# Patient Record
Sex: FEMALE | Race: OTHER | Employment: OTHER
[De-identification: names, ages, dates, MRNs, and addresses within clinical notes are randomized per-mention and may not be internally consistent; named-entity substitution may affect disease eponyms.]

---

## 2024-05-25 ENCOUNTER — APPOINTMENT (OUTPATIENT)
Facility: HOSPITAL | Age: 84
DRG: 689 | End: 2024-05-25

## 2024-05-25 ENCOUNTER — HOSPITAL ENCOUNTER (INPATIENT)
Facility: HOSPITAL | Age: 84
LOS: 2 days | Discharge: HOME | DRG: 689 | End: 2024-05-27
Attending: STUDENT IN AN ORGANIZED HEALTH CARE EDUCATION/TRAINING PROGRAM | Admitting: HOSPITALIST

## 2024-05-25 DIAGNOSIS — A41.9 SEPTICEMIA (HCC): ICD-10-CM

## 2024-05-25 DIAGNOSIS — I42.9 CARDIOMYOPATHY, UNSPECIFIED TYPE (HCC): ICD-10-CM

## 2024-05-25 DIAGNOSIS — L89.154 PRESSURE INJURY OF SACRAL REGION, STAGE 4 (HCC): Primary | ICD-10-CM

## 2024-05-25 DIAGNOSIS — M86.9 OSTEOMYELITIS, UNSPECIFIED SITE, UNSPECIFIED TYPE (HCC): ICD-10-CM

## 2024-05-25 DIAGNOSIS — J81.1 CHRONIC PULMONARY EDEMA: ICD-10-CM

## 2024-05-25 DIAGNOSIS — I48.91 ATRIAL FIBRILLATION, UNSPECIFIED TYPE (HCC): ICD-10-CM

## 2024-05-25 DIAGNOSIS — N39.0 URINARY TRACT INFECTION WITHOUT HEMATURIA, SITE UNSPECIFIED: ICD-10-CM

## 2024-05-25 LAB
ALBUMIN SERPL-MCNC: 2.6 G/DL (ref 3.5–5)
ALBUMIN/GLOB SERPL: 0.6 (ref 1.1–2.2)
ALP SERPL-CCNC: 82 U/L (ref 45–117)
ALT SERPL-CCNC: 13 U/L (ref 12–78)
ANION GAP SERPL CALC-SCNC: 9 MMOL/L (ref 5–15)
APPEARANCE UR: ABNORMAL
AST SERPL-CCNC: 6 U/L (ref 15–37)
BACTERIA URNS QL MICRO: ABNORMAL /HPF
BASOPHILS # BLD: 0 K/UL (ref 0–0.1)
BASOPHILS NFR BLD: 0 % (ref 0–1)
BILIRUB SERPL-MCNC: 0.6 MG/DL (ref 0.2–1)
BILIRUB UR QL: NEGATIVE
BUN SERPL-MCNC: 55 MG/DL (ref 6–20)
BUN/CREAT SERPL: 31 (ref 12–20)
CALCIUM SERPL-MCNC: 9.9 MG/DL (ref 8.5–10.1)
CHLORIDE SERPL-SCNC: 104 MMOL/L (ref 97–108)
CO2 SERPL-SCNC: 24 MMOL/L (ref 21–32)
COLOR UR: ABNORMAL
COMMENT:: NORMAL
CREAT SERPL-MCNC: 1.75 MG/DL (ref 0.55–1.02)
DIFFERENTIAL METHOD BLD: ABNORMAL
EOSINOPHIL # BLD: 0 K/UL (ref 0–0.4)
EOSINOPHIL NFR BLD: 0 % (ref 0–7)
EPITH CASTS URNS QL MICRO: ABNORMAL /LPF
ERYTHROCYTE [DISTWIDTH] IN BLOOD BY AUTOMATED COUNT: 19.1 % (ref 11.5–14.5)
GLOBULIN SER CALC-MCNC: 4.3 G/DL (ref 2–4)
GLUCOSE SERPL-MCNC: 121 MG/DL (ref 65–100)
GLUCOSE UR STRIP.AUTO-MCNC: NEGATIVE MG/DL
GRAN CASTS URNS QL MICRO: ABNORMAL /LPF
HCT VFR BLD AUTO: 34.1 % (ref 35–47)
HGB BLD-MCNC: 11 G/DL (ref 11.5–16)
HGB UR QL STRIP: ABNORMAL
IMM GRANULOCYTES # BLD AUTO: 0 K/UL (ref 0–0.04)
IMM GRANULOCYTES NFR BLD AUTO: 0 % (ref 0–0.5)
KETONES UR QL STRIP.AUTO: NEGATIVE MG/DL
LACTATE SERPL-SCNC: 0.5 MMOL/L (ref 0.4–2)
LEUKOCYTE ESTERASE UR QL STRIP.AUTO: ABNORMAL
LIPASE SERPL-CCNC: 9 U/L (ref 13–75)
LYMPHOCYTES # BLD: 0.7 K/UL (ref 0.8–3.5)
LYMPHOCYTES NFR BLD: 11 % (ref 12–49)
MCH RBC QN AUTO: 30 PG (ref 26–34)
MCHC RBC AUTO-ENTMCNC: 32.3 G/DL (ref 30–36.5)
MCV RBC AUTO: 92.9 FL (ref 80–99)
MONOCYTES # BLD: 0.6 K/UL (ref 0–1)
MONOCYTES NFR BLD: 9 % (ref 5–13)
NEUTS SEG # BLD: 5 K/UL (ref 1.8–8)
NEUTS SEG NFR BLD: 80 % (ref 32–75)
NITRITE UR QL STRIP.AUTO: NEGATIVE
NRBC # BLD: 0 K/UL (ref 0–0.01)
NRBC BLD-RTO: 0 PER 100 WBC
PH UR STRIP: 5.5 (ref 5–8)
PLATELET # BLD AUTO: 307 K/UL (ref 150–400)
PMV BLD AUTO: 10.9 FL (ref 8.9–12.9)
POTASSIUM SERPL-SCNC: 3.2 MMOL/L (ref 3.5–5.1)
PROT SERPL-MCNC: 6.9 G/DL (ref 6.4–8.2)
PROT UR STRIP-MCNC: 300 MG/DL
RBC # BLD AUTO: 3.67 M/UL (ref 3.8–5.2)
RBC #/AREA URNS HPF: ABNORMAL /HPF (ref 0–5)
RBC MORPH BLD: ABNORMAL
SODIUM SERPL-SCNC: 137 MMOL/L (ref 136–145)
SP GR UR REFRACTOMETRY: 1.02 (ref 1–1.03)
SPECIMEN HOLD: NORMAL
SPECIMEN HOLD: NORMAL
TROPONIN I SERPL HS-MCNC: 72 NG/L (ref 0–51)
UROBILINOGEN UR QL STRIP.AUTO: 1 EU/DL (ref 0.2–1)
WBC # BLD AUTO: 6.3 K/UL (ref 3.6–11)
WBC URNS QL MICRO: >100 /HPF (ref 0–4)
YEAST URNS QL MICRO: PRESENT

## 2024-05-25 PROCEDURE — 84484 ASSAY OF TROPONIN QUANT: CPT

## 2024-05-25 PROCEDURE — 2580000003 HC RX 258: Performed by: HOSPITALIST

## 2024-05-25 PROCEDURE — 87086 URINE CULTURE/COLONY COUNT: CPT

## 2024-05-25 PROCEDURE — 80053 COMPREHEN METABOLIC PANEL: CPT

## 2024-05-25 PROCEDURE — 83605 ASSAY OF LACTIC ACID: CPT

## 2024-05-25 PROCEDURE — 87040 BLOOD CULTURE FOR BACTERIA: CPT

## 2024-05-25 PROCEDURE — 81001 URINALYSIS AUTO W/SCOPE: CPT

## 2024-05-25 PROCEDURE — 99285 EMERGENCY DEPT VISIT HI MDM: CPT

## 2024-05-25 PROCEDURE — 6360000002 HC RX W HCPCS: Performed by: STUDENT IN AN ORGANIZED HEALTH CARE EDUCATION/TRAINING PROGRAM

## 2024-05-25 PROCEDURE — 71045 X-RAY EXAM CHEST 1 VIEW: CPT

## 2024-05-25 PROCEDURE — 83690 ASSAY OF LIPASE: CPT

## 2024-05-25 PROCEDURE — 36415 COLL VENOUS BLD VENIPUNCTURE: CPT

## 2024-05-25 PROCEDURE — 6370000000 HC RX 637 (ALT 250 FOR IP): Performed by: HOSPITALIST

## 2024-05-25 PROCEDURE — 74176 CT ABD & PELVIS W/O CONTRAST: CPT

## 2024-05-25 PROCEDURE — 87077 CULTURE AEROBIC IDENTIFY: CPT

## 2024-05-25 PROCEDURE — 6360000002 HC RX W HCPCS: Performed by: HOSPITALIST

## 2024-05-25 PROCEDURE — 2580000003 HC RX 258: Performed by: STUDENT IN AN ORGANIZED HEALTH CARE EDUCATION/TRAINING PROGRAM

## 2024-05-25 PROCEDURE — 93005 ELECTROCARDIOGRAM TRACING: CPT | Performed by: STUDENT IN AN ORGANIZED HEALTH CARE EDUCATION/TRAINING PROGRAM

## 2024-05-25 PROCEDURE — 02HV33Z INSERTION OF INFUSION DEVICE INTO SUPERIOR VENA CAVA, PERCUTANEOUS APPROACH: ICD-10-PCS | Performed by: HOSPITALIST

## 2024-05-25 PROCEDURE — 96365 THER/PROPH/DIAG IV INF INIT: CPT

## 2024-05-25 PROCEDURE — 87186 SC STD MICRODIL/AGAR DIL: CPT

## 2024-05-25 PROCEDURE — 85025 COMPLETE CBC W/AUTO DIFF WBC: CPT

## 2024-05-25 PROCEDURE — 2000000000 HC ICU R&B

## 2024-05-25 PROCEDURE — 94761 N-INVAS EAR/PLS OXIMETRY MLT: CPT

## 2024-05-25 RX ORDER — POLYETHYLENE GLYCOL 3350 17 G/17G
17 POWDER, FOR SOLUTION ORAL DAILY PRN
Status: DISCONTINUED | OUTPATIENT
Start: 2024-05-25 | End: 2024-05-27 | Stop reason: HOSPADM

## 2024-05-25 RX ORDER — SODIUM CHLORIDE 0.9 % (FLUSH) 0.9 %
5-40 SYRINGE (ML) INJECTION PRN
Status: DISCONTINUED | OUTPATIENT
Start: 2024-05-25 | End: 2024-05-27 | Stop reason: HOSPADM

## 2024-05-25 RX ORDER — HEPARIN SODIUM 5000 [USP'U]/ML
5000 INJECTION, SOLUTION INTRAVENOUS; SUBCUTANEOUS EVERY 8 HOURS SCHEDULED
Status: DISCONTINUED | OUTPATIENT
Start: 2024-05-25 | End: 2024-05-26

## 2024-05-25 RX ORDER — ONDANSETRON 4 MG/1
4 TABLET, ORALLY DISINTEGRATING ORAL EVERY 8 HOURS PRN
Status: DISCONTINUED | OUTPATIENT
Start: 2024-05-25 | End: 2024-05-27 | Stop reason: HOSPADM

## 2024-05-25 RX ORDER — SODIUM CHLORIDE 0.9 % (FLUSH) 0.9 %
5-40 SYRINGE (ML) INJECTION EVERY 12 HOURS SCHEDULED
Status: DISCONTINUED | OUTPATIENT
Start: 2024-05-25 | End: 2024-05-27 | Stop reason: HOSPADM

## 2024-05-25 RX ORDER — SODIUM CHLORIDE 9 MG/ML
INJECTION, SOLUTION INTRAVENOUS PRN
Status: DISCONTINUED | OUTPATIENT
Start: 2024-05-25 | End: 2024-05-27 | Stop reason: HOSPADM

## 2024-05-25 RX ORDER — ACETAMINOPHEN 325 MG/1
650 TABLET ORAL EVERY 6 HOURS PRN
Status: DISCONTINUED | OUTPATIENT
Start: 2024-05-25 | End: 2024-05-27 | Stop reason: HOSPADM

## 2024-05-25 RX ORDER — ONDANSETRON 2 MG/ML
4 INJECTION INTRAMUSCULAR; INTRAVENOUS EVERY 6 HOURS PRN
Status: DISCONTINUED | OUTPATIENT
Start: 2024-05-25 | End: 2024-05-27 | Stop reason: HOSPADM

## 2024-05-25 RX ORDER — DILTIAZEM HYDROCHLORIDE 5 MG/ML
10 INJECTION INTRAVENOUS
Status: DISCONTINUED | OUTPATIENT
Start: 2024-05-25 | End: 2024-05-27 | Stop reason: HOSPADM

## 2024-05-25 RX ORDER — LEVOTHYROXINE SODIUM 0.05 MG/1
50 TABLET ORAL
Status: DISCONTINUED | OUTPATIENT
Start: 2024-05-26 | End: 2024-05-27

## 2024-05-25 RX ORDER — FAMOTIDINE 20 MG/1
20 TABLET, FILM COATED ORAL DAILY
Status: DISCONTINUED | OUTPATIENT
Start: 2024-05-26 | End: 2024-05-27 | Stop reason: HOSPADM

## 2024-05-25 RX ORDER — ACETAMINOPHEN 650 MG/1
650 SUPPOSITORY RECTAL EVERY 6 HOURS PRN
Status: DISCONTINUED | OUTPATIENT
Start: 2024-05-25 | End: 2024-05-26

## 2024-05-25 RX ADMIN — HEPARIN SODIUM 5000 UNITS: 5000 INJECTION INTRAVENOUS; SUBCUTANEOUS at 21:45

## 2024-05-25 RX ADMIN — SODIUM CHLORIDE 5 MG/HR: 9 INJECTION, SOLUTION INTRAVENOUS at 19:00

## 2024-05-25 RX ADMIN — CEFEPIME 2000 MG: 2 INJECTION, POWDER, FOR SOLUTION INTRAVENOUS at 13:00

## 2024-05-25 RX ADMIN — SODIUM CHLORIDE: 9 INJECTION, SOLUTION INTRAVENOUS at 18:46

## 2024-05-25 RX ADMIN — NITROGLYCERIN 0.5 INCH: 20 OINTMENT TOPICAL at 16:11

## 2024-05-25 RX ADMIN — SODIUM CHLORIDE 1500 MG: 9 INJECTION, SOLUTION INTRAVENOUS at 18:47

## 2024-05-25 RX ADMIN — SODIUM CHLORIDE, PRESERVATIVE FREE 10 ML: 5 INJECTION INTRAVENOUS at 21:46

## 2024-05-25 ASSESSMENT — PAIN SCALES - GENERAL
PAINLEVEL_OUTOF10: 4
PAINLEVEL_OUTOF10: 0

## 2024-05-25 ASSESSMENT — PAIN DESCRIPTION - DESCRIPTORS: DESCRIPTORS: ACHING

## 2024-05-25 ASSESSMENT — PAIN DESCRIPTION - ORIENTATION: ORIENTATION: MID

## 2024-05-25 ASSESSMENT — PAIN DESCRIPTION - LOCATION: LOCATION: CHEST

## 2024-05-25 NOTE — ED NOTES
Patient altered at baseline. Using the , patient told this RN she lives with her niece and \"she should be here.\" No family currently present. Patient cannot remember her niece's name or phone number. Patient is a poor historian. Dr. White at bedside with patient.

## 2024-05-25 NOTE — CONSULTS
[facility] INITIAL ASSESSMENT.      Name: Cuco Chao   : 1940   MRN: [id number]   Date: 2024        Chief Complaint   Patient presents with    Emesis         HPI:     82 yo female with hx of htn, ESRD on HD presneting with chest pain, nausea and diarrhea.     Poor historian. Not able to give details despite .  Does not appear to have significant shortness of breath.  Reports that her chest pain has improved slightly.  Says that she has been having significant diarrhea for the last few days.  She has had 2 episodes of diarrhea since coming to the emergency room.  Reports feeling thirsty.  No fever or chills.  No vomiting since being in the emergency room.    No IV access despite multiple attempts in ED. SBP in 200s.        Problem list:     Hypertensive emergency  ESRD on hemodialysis  Dehydration  A.fib  Gastroenteritis  Difficult IV access  UTI    Assessment/Plan:     -Recommend Nitropatch or clonidine patch for hypertension in the absence of IV access.  - recommend prn hydralazine/labetalol for htn emergency.  -Cardene drip pain if SBP elevated despite hydralazine/labetalol -Nephrology consultation for dialysis.  -Cycle troponins.  -PPI for gastritis  -Antibiotics for UTI  -CT abdomen/pelvis without contrast reviewed.   -Primary team working on obtaining more information about the patient and being able to track patient's family.  Currently, we have limited information.  -Renal diet  -Heparin subcu for prophylaxis.  -Goals of care and CODE STATUS deferred to primary team.      I personally spent 30 minutes of critical care time.  This is time spent at this critically ill patient's bedside actively involved in patient care as well as the coordination of care and discussions with the patient's family.  This does not include any procedural time which has been billed separately.        Review of systems:     Review of Systems     Unable to provide detailed review of

## 2024-05-25 NOTE — ED NOTES
Patient IV has infiltrated. Multiple attempts have been with Ultrasound without success. MD, Dr. White notified.

## 2024-05-25 NOTE — ED PROVIDER NOTES
[facility] INTENSIVE CARE UNIT  EMERGENCY DEPARTMENT ENCOUNTER      Pt Name: Cuco Chao  MRN: [id number]  Birthdate 1940  Date of evaluation: 5/25/2024  Provider: Bernice White DO    CHIEF COMPLAINT       Chief Complaint   Patient presents with    Emesis       PMH No past medical history on file.      MDM:   Vitals:    Vitals:    05/25/24 1804   BP:    Pulse: (!) 110   Resp: 23   Temp:    SpO2: 93%           This is a 83 y.o. female with pmhx ESRD on HD T/R/Sa, dementia baseline Aox1-2 who presents today for cc of vomiting. Patient unable to provide history due to dementia,  utilized and she does complains of diarrhea today and denies chest pain. Per EMS family notes that patient had diarrhea and vomiting at home, increasingly fatigued.     On arrival VS include tachycardia, otherwise stable.   Physical Exam  General: Alert, no acute distress  HEENT: Normocephalic, atraumatic. EOMI, moist oral mucosa, no conjunctival injection  Neck: ROM normal, supple  Cardio: Heart regular rate and rhythm, cap refill <2seconds  Lungs: No respiratory distress, no wheezing, CTAB  Abdomen: Soft, nontender  MSK: chronic debility at baseline, large sacral decubitus ulcer stage IV with large amount of grayish discharge.  Skin: Warm, dry, no rash  Neuro: At baseline mentation, Aox2    Labs notable for UTI and urinalysis, mildly elevated troponin.  EKG showing A-fib.  Not entirely sure if she has A-fib at baseline is she is not aware of what her Social Security is and we cannot access her prior charts.  Imaging with some fluid overload, no acute process.  Will defer A-fib management to inpatient team.  Sepsis noted at time in the chart, I did give empiric coverage for likely osteomyelitis of the sacral decubitus ulcer as well as UTI.  Will admit for dialysis and further evaluation.    Diagnosis is sacral decubitus ulcer, osteomyelitis, UTI, septicemia, atrial fibrillation unknown if chronic or new,

## 2024-05-25 NOTE — ED TRIAGE NOTES
Patient arrives to the ED for complaints of nausea and vomiting with increased weakness for the past 24 hours. Patient is at baseline mentation.     Patient is due for dialysis.     Polish speaking.     PMH Dementia, Kidney failure.

## 2024-05-25 NOTE — ED NOTES
TRANSFER - OUT REPORT:    Verbal report given to Mary RN on Cuco Chao  being transferred to ICU for routine progression of patient care       Report consisted of patient's Situation, Background, Assessment and   Recommendations(SBAR).     Information from the following report(s) Index, MAR, Recent Results, and Neuro Assessment was reviewed with the receiving nurse.    [place] Fall Assessment:    Presents to emergency department  because of falls (Syncope, seizure, or loss of consciousness): No  Age > 70: Yes  Altered Mental Status, Intoxication with alcohol or substance confusion (Disorientation, impaired judgment, poor safety awaremess, or inability to follow instructions): Yes  Impaired Mobility: Ambulates or transfers with assistive devices or assistance; Unable to ambulate or transer.: Yes  Nursing Judgement: Yes          Lines:       Opportunity for questions and clarification was provided.      Patient transported with:  Registered Nurse, Monitor

## 2024-05-25 NOTE — PROCEDURES
PROCEDURE NOTE  Date: 5/25/2024   Name: Cuco Chao  YOB: 1940    Central Line    Date/Time: 5/25/2024 5:00 PM    Performed by: Pastor Russ MD  Authorized by: Pastor Russ MD    Consent:     Consent obtained:  Verbal    Consent given by:  Patient    Risks, benefits, and alternatives were discussed: yes    Universal protocol:     Patient identity confirmed:  Verbally with patient and arm band  Pre-procedure details:     Indication(s): insufficient peripheral access      Hand hygiene: Hand hygiene performed prior to insertion      Sterile barrier technique: All elements of maximal sterile technique followed      Skin preparation:  Chlorhexidine    Skin preparation agent: Skin preparation agent completely dried prior to procedure    Sedation:     Sedation type:  None  Procedure details:     Location:  L internal jugular    Patient position:  Trendelenburg    Procedural supplies:  Triple lumen    Number of attempts:  1    Successful placement: yes    Post-procedure details:     Post-procedure:  Line sutured    Assessment:  Blood return through all ports    Procedure completion:  Tolerated well, no immediate complications

## 2024-05-25 NOTE — H&P
Hospitalist Admission Note      NAME:  Cuco Chao   :  1940   MRN:  [id number]     Date/Time:  2024 2:13 PM    Patient PCP: None, None    ________________________________________________________________________    Given the patient's current clinical presentation, I have a high level of concern for decompensation if discharged from the emergency department.  Complex decision making was performed, which includes reviewing the patient's available past medical records, laboratory results, and x-ray films.       My assessment of this patient's clinical condition and my plan of care is as follows.    Assessment / Plan:  Patient is a 83-year-old female with history of ESRD, hypertension, hypothyroidism, dementia comes to the hospital with chief complaint of weakness, nausea and vomiting.  She was also found to have very high blood pressure on arrival.    1.  Hypertensive urgency  Start patient on Cardene drip.  Admit to ICU    2.  ESRD on hemodialysis  Nephrology consult.     3.  Elevated troponin  Probably due to renal failure and hypertensive urgency.  Trend the troponin    4.  Hypothyroidism  Continue Synthroid 50 mcg daily    5.  GERD  Continue Pepcid    6.  Dementia with behavioral disturbance  Continue quetiapine.    7.  UTI  Patient started on IV cefepime in the ER.  I will continue with that.  Urine culture    8.  A-fib with RVR  IV metoprolol 5 mg once.  Potassium is 3.2.  I will replace it.  If ventricle rate continues to be fast then she will be started on Cardizem drip instead of Cardene.    I have personally reviewed the radiographs, laboratory data in Epic and decisions and statements above are based partially on this personal interpretation.    Code Status: Full Code  DVT Prophylaxis: Hep SQ  GI Prophylaxis: not indicated    I personally spent 35 minutes of critical care time with the patient. Patient is at high risk of decompensation.     Subjective:   CHIEF COMPLAINT:

## 2024-05-26 PROBLEM — I10 HYPERTENSION: Status: ACTIVE | Noted: 2024-05-26

## 2024-05-26 PROBLEM — R82.81 PYURIA: Status: ACTIVE | Noted: 2024-05-26

## 2024-05-26 PROBLEM — R79.89 TROPONIN I ABOVE REFERENCE RANGE: Status: ACTIVE | Noted: 2024-05-26

## 2024-05-26 PROBLEM — E88.09 HYPOALBUMINEMIA: Status: ACTIVE | Noted: 2024-05-26

## 2024-05-26 PROBLEM — R07.9 CHEST PAIN: Status: ACTIVE | Noted: 2024-05-26

## 2024-05-26 PROBLEM — D64.9 ANEMIA: Status: ACTIVE | Noted: 2024-05-26

## 2024-05-26 PROBLEM — R19.7 NAUSEA VOMITING AND DIARRHEA: Status: ACTIVE | Noted: 2024-05-26

## 2024-05-26 PROBLEM — R11.2 NAUSEA VOMITING AND DIARRHEA: Status: ACTIVE | Noted: 2024-05-26

## 2024-05-26 PROBLEM — E87.6 HYPOKALEMIA: Status: ACTIVE | Noted: 2024-05-26

## 2024-05-26 PROBLEM — I48.91 ATRIAL FIBRILLATION (HCC): Status: ACTIVE | Noted: 2024-05-26

## 2024-05-26 PROBLEM — N18.6 ESRD (END STAGE RENAL DISEASE) ON DIALYSIS (HCC): Status: ACTIVE | Noted: 2024-05-26

## 2024-05-26 PROBLEM — Z99.2 ESRD (END STAGE RENAL DISEASE) ON DIALYSIS (HCC): Status: ACTIVE | Noted: 2024-05-26

## 2024-05-26 PROBLEM — J90 PLEURAL EFFUSION: Status: ACTIVE | Noted: 2024-05-26

## 2024-05-26 PROBLEM — L89.90 DECUBITUS ULCER: Status: ACTIVE | Noted: 2024-05-26

## 2024-05-26 PROBLEM — I16.0 HYPERTENSIVE URGENCY: Status: ACTIVE | Noted: 2024-05-26

## 2024-05-26 PROBLEM — F03.90 DEMENTIA (HCC): Status: ACTIVE | Noted: 2024-05-26

## 2024-05-26 LAB
-: NORMAL
AMPHET UR QL SCN: NEGATIVE
ANION GAP SERPL CALC-SCNC: 11 MMOL/L (ref 5–15)
APPEARANCE UR: ABNORMAL
BACTERIA URNS QL MICRO: ABNORMAL /HPF
BARBITURATES UR QL SCN: NEGATIVE
BASOPHILS # BLD: 0 K/UL (ref 0–0.1)
BASOPHILS NFR BLD: 0 % (ref 0–1)
BENZODIAZ UR QL: NEGATIVE
BILIRUB UR QL: NEGATIVE
BUN SERPL-MCNC: 58 MG/DL (ref 6–20)
BUN/CREAT SERPL: 31 (ref 12–20)
CALCIUM SERPL-MCNC: 9.2 MG/DL (ref 8.5–10.1)
CANNABINOIDS UR QL SCN: NEGATIVE
CHLORIDE SERPL-SCNC: 103 MMOL/L (ref 97–108)
CO2 SERPL-SCNC: 23 MMOL/L (ref 21–32)
COCAINE UR QL SCN: NEGATIVE
COLOR UR: ABNORMAL
CREAT SERPL-MCNC: 1.87 MG/DL (ref 0.55–1.02)
DIFFERENTIAL METHOD BLD: ABNORMAL
EKG ATRIAL RATE: 100 BPM
EKG DIAGNOSIS: NORMAL
EKG Q-T INTERVAL: 370 MS
EKG QRS DURATION: 98 MS
EKG QTC CALCULATION (BAZETT): 486 MS
EKG R AXIS: 41 DEGREES
EKG T AXIS: 216 DEGREES
EKG VENTRICULAR RATE: 104 BPM
EOSINOPHIL # BLD: 0 K/UL (ref 0–0.4)
EOSINOPHIL NFR BLD: 0 % (ref 0–7)
EPITH CASTS URNS QL MICRO: ABNORMAL /LPF
ERYTHROCYTE [DISTWIDTH] IN BLOOD BY AUTOMATED COUNT: 18.9 % (ref 11.5–14.5)
FERRITIN SERPL-MCNC: 880 NG/ML (ref 26–388)
FOLATE SERPL-MCNC: 14.1 NG/ML (ref 5–21)
GLUCOSE SERPL-MCNC: 92 MG/DL (ref 65–100)
GLUCOSE UR STRIP.AUTO-MCNC: NEGATIVE MG/DL
HAPTOGLOB SERPL-MCNC: 204 MG/DL (ref 30–200)
HAV IGM SER QL: NONREACTIVE
HBV CORE IGM SER QL: NONREACTIVE
HBV SURFACE AG SER QL: <0.1 INDEX
HBV SURFACE AG SER QL: NEGATIVE
HCT VFR BLD AUTO: 31.5 % (ref 35–47)
HCV AB SER IA-ACNC: 0.07 INDEX
HCV AB SERPL QL IA: NONREACTIVE
HGB BLD-MCNC: 10 G/DL (ref 11.5–16)
HGB UR QL STRIP: ABNORMAL
IMM GRANULOCYTES # BLD AUTO: 0 K/UL (ref 0–0.04)
IMM GRANULOCYTES NFR BLD AUTO: 0 % (ref 0–0.5)
IRON SATN MFR SERPL: 17 % (ref 20–50)
IRON SERPL-MCNC: 30 UG/DL (ref 35–150)
KETONES UR QL STRIP.AUTO: ABNORMAL MG/DL
LDH SERPL L TO P-CCNC: 160 U/L (ref 81–246)
LEUKOCYTE ESTERASE UR QL STRIP.AUTO: ABNORMAL
LYMPHOCYTES # BLD: 0.7 K/UL (ref 0.8–3.5)
LYMPHOCYTES NFR BLD: 11 % (ref 12–49)
Lab: NORMAL
MAGNESIUM SERPL-MCNC: 2.6 MG/DL (ref 1.6–2.4)
MCH RBC QN AUTO: 29.8 PG (ref 26–34)
MCHC RBC AUTO-ENTMCNC: 31.7 G/DL (ref 30–36.5)
MCV RBC AUTO: 93.8 FL (ref 80–99)
METHADONE UR QL: NEGATIVE
MONOCYTES # BLD: 0.4 K/UL (ref 0–1)
MONOCYTES NFR BLD: 7 % (ref 5–13)
NEUTS SEG # BLD: 5 K/UL (ref 1.8–8)
NEUTS SEG NFR BLD: 81 % (ref 32–75)
NITRITE UR QL STRIP.AUTO: NEGATIVE
NRBC # BLD: 0 K/UL (ref 0–0.01)
NRBC BLD-RTO: 0 PER 100 WBC
OPIATES UR QL: NEGATIVE
PCP UR QL: NEGATIVE
PH UR STRIP: 5.5 (ref 5–8)
PHOSPHATE SERPL-MCNC: 4.4 MG/DL (ref 2.6–4.7)
PLATELET # BLD AUTO: 277 K/UL (ref 150–400)
PMV BLD AUTO: 10.9 FL (ref 8.9–12.9)
POTASSIUM SERPL-SCNC: 3.2 MMOL/L (ref 3.5–5.1)
PROCALCITONIN SERPL-MCNC: 0.63 NG/ML
PROT UR STRIP-MCNC: 100 MG/DL
RBC # BLD AUTO: 3.36 M/UL (ref 3.8–5.2)
RBC #/AREA URNS HPF: ABNORMAL /HPF (ref 0–5)
SODIUM SERPL-SCNC: 137 MMOL/L (ref 136–145)
SP GR UR REFRACTOMETRY: 1.02 (ref 1–1.03)
TIBC SERPL-MCNC: 175 UG/DL (ref 250–450)
TROPONIN I SERPL HS-MCNC: 62 NG/L (ref 0–51)
TSH SERPL DL<=0.05 MIU/L-ACNC: 1.9 UIU/ML (ref 0.36–3.74)
UROBILINOGEN UR QL STRIP.AUTO: 1 EU/DL (ref 0.2–1)
VIT B12 SERPL-MCNC: >2000 PG/ML (ref 193–986)
WBC # BLD AUTO: 6.1 K/UL (ref 3.6–11)
WBC URNS QL MICRO: >100 /HPF (ref 0–4)
YEAST URNS QL MICRO: PRESENT

## 2024-05-26 PROCEDURE — 84145 PROCALCITONIN (PCT): CPT

## 2024-05-26 PROCEDURE — 83735 ASSAY OF MAGNESIUM: CPT

## 2024-05-26 PROCEDURE — 82746 ASSAY OF FOLIC ACID SERUM: CPT

## 2024-05-26 PROCEDURE — 1100000000 HC RM PRIVATE

## 2024-05-26 PROCEDURE — 94761 N-INVAS EAR/PLS OXIMETRY MLT: CPT

## 2024-05-26 PROCEDURE — 6360000002 HC RX W HCPCS: Performed by: HOSPITALIST

## 2024-05-26 PROCEDURE — 84484 ASSAY OF TROPONIN QUANT: CPT

## 2024-05-26 PROCEDURE — 83540 ASSAY OF IRON: CPT

## 2024-05-26 PROCEDURE — 80074 ACUTE HEPATITIS PANEL: CPT

## 2024-05-26 PROCEDURE — 83550 IRON BINDING TEST: CPT

## 2024-05-26 PROCEDURE — 6370000000 HC RX 637 (ALT 250 FOR IP): Performed by: INTERNAL MEDICINE

## 2024-05-26 PROCEDURE — 2580000003 HC RX 258: Performed by: HOSPITALIST

## 2024-05-26 PROCEDURE — 84100 ASSAY OF PHOSPHORUS: CPT

## 2024-05-26 PROCEDURE — 6370000000 HC RX 637 (ALT 250 FOR IP): Performed by: HOSPITALIST

## 2024-05-26 PROCEDURE — 82728 ASSAY OF FERRITIN: CPT

## 2024-05-26 PROCEDURE — 5A1D70Z PERFORMANCE OF URINARY FILTRATION, INTERMITTENT, LESS THAN 6 HOURS PER DAY: ICD-10-PCS | Performed by: INTERNAL MEDICINE

## 2024-05-26 PROCEDURE — 84443 ASSAY THYROID STIM HORMONE: CPT

## 2024-05-26 PROCEDURE — 36415 COLL VENOUS BLD VENIPUNCTURE: CPT

## 2024-05-26 PROCEDURE — 82607 VITAMIN B-12: CPT

## 2024-05-26 PROCEDURE — 99223 1ST HOSP IP/OBS HIGH 75: CPT | Performed by: INTERNAL MEDICINE

## 2024-05-26 PROCEDURE — 81001 URINALYSIS AUTO W/SCOPE: CPT

## 2024-05-26 PROCEDURE — 90935 HEMODIALYSIS ONE EVALUATION: CPT

## 2024-05-26 PROCEDURE — 85025 COMPLETE CBC W/AUTO DIFF WBC: CPT

## 2024-05-26 PROCEDURE — 83010 ASSAY OF HAPTOGLOBIN QUANT: CPT

## 2024-05-26 PROCEDURE — 87086 URINE CULTURE/COLONY COUNT: CPT

## 2024-05-26 PROCEDURE — 83615 LACTATE (LD) (LDH) ENZYME: CPT

## 2024-05-26 PROCEDURE — 80307 DRUG TEST PRSMV CHEM ANLYZR: CPT

## 2024-05-26 PROCEDURE — 80048 BASIC METABOLIC PNL TOTAL CA: CPT

## 2024-05-26 RX ORDER — OMEPRAZOLE 20 MG/1
20 CAPSULE, DELAYED RELEASE ORAL DAILY
COMMUNITY

## 2024-05-26 RX ORDER — ATORVASTATIN CALCIUM 20 MG/1
20 TABLET, FILM COATED ORAL DAILY
COMMUNITY

## 2024-05-26 RX ORDER — BUMETANIDE 1 MG/1
1 TABLET ORAL 2 TIMES DAILY
Status: ON HOLD | COMMUNITY
End: 2024-05-27 | Stop reason: HOSPADM

## 2024-05-26 RX ORDER — CETIRIZINE HYDROCHLORIDE 5 MG/1
5 TABLET ORAL DAILY
COMMUNITY

## 2024-05-26 RX ORDER — ACETAMINOPHEN 500 MG
500 TABLET ORAL
COMMUNITY

## 2024-05-26 RX ORDER — ISOSORBIDE MONONITRATE 30 MG/1
30 TABLET, EXTENDED RELEASE ORAL DAILY
COMMUNITY

## 2024-05-26 RX ORDER — ASPIRIN 81 MG/1
81 TABLET, CHEWABLE ORAL DAILY
COMMUNITY

## 2024-05-26 RX ORDER — CARVEDILOL 6.25 MG/1
6.25 TABLET ORAL 2 TIMES DAILY WITH MEALS
Status: DISCONTINUED | OUTPATIENT
Start: 2024-05-26 | End: 2024-05-27 | Stop reason: HOSPADM

## 2024-05-26 RX ORDER — FAMOTIDINE 10 MG
10 TABLET ORAL
COMMUNITY

## 2024-05-26 RX ORDER — POTASSIUM CHLORIDE 750 MG/1
20 TABLET, FILM COATED, EXTENDED RELEASE ORAL ONCE
Status: COMPLETED | OUTPATIENT
Start: 2024-05-26 | End: 2024-05-26

## 2024-05-26 RX ORDER — LEVOTHYROXINE SODIUM 0.03 MG/1
25 TABLET ORAL DAILY
Status: ON HOLD | COMMUNITY
End: 2024-05-27 | Stop reason: HOSPADM

## 2024-05-26 RX ORDER — FERROUS SULFATE 325(65) MG
325 TABLET ORAL
Status: ON HOLD | COMMUNITY
End: 2024-05-27 | Stop reason: HOSPADM

## 2024-05-26 RX ORDER — SENNA AND DOCUSATE SODIUM 50; 8.6 MG/1; MG/1
1 TABLET, FILM COATED ORAL
COMMUNITY

## 2024-05-26 RX ADMIN — CEFEPIME 1000 MG: 1 INJECTION, POWDER, FOR SOLUTION INTRAMUSCULAR; INTRAVENOUS at 01:20

## 2024-05-26 RX ADMIN — LEVOTHYROXINE SODIUM 50 MCG: 0.05 TABLET ORAL at 06:00

## 2024-05-26 RX ADMIN — HEPARIN SODIUM 5000 UNITS: 5000 INJECTION INTRAVENOUS; SUBCUTANEOUS at 06:00

## 2024-05-26 RX ADMIN — SODIUM CHLORIDE, PRESERVATIVE FREE 10 ML: 5 INJECTION INTRAVENOUS at 08:58

## 2024-05-26 RX ADMIN — POTASSIUM CHLORIDE 20 MEQ: 750 TABLET, FILM COATED, EXTENDED RELEASE ORAL at 14:31

## 2024-05-26 RX ADMIN — APIXABAN 2.5 MG: 2.5 TABLET, FILM COATED ORAL at 22:01

## 2024-05-26 RX ADMIN — FAMOTIDINE 20 MG: 20 TABLET, FILM COATED ORAL at 08:13

## 2024-05-26 RX ADMIN — CARVEDILOL 6.25 MG: 6.25 TABLET, FILM COATED ORAL at 08:13

## 2024-05-26 RX ADMIN — APIXABAN 2.5 MG: 2.5 TABLET, FILM COATED ORAL at 14:31

## 2024-05-26 RX ADMIN — CEFEPIME 1000 MG: 1 INJECTION, POWDER, FOR SOLUTION INTRAMUSCULAR; INTRAVENOUS at 12:39

## 2024-05-26 RX ADMIN — CARVEDILOL 6.25 MG: 6.25 TABLET, FILM COATED ORAL at 16:54

## 2024-05-26 ASSESSMENT — PAIN SCALES - GENERAL
PAINLEVEL_OUTOF10: 0

## 2024-05-26 NOTE — CONSULTS
[facility] CARDIOLOGY  Cardiology Note     [x]Initial Encounter     []Follow-up    Patient Name: Cuco Chao - :1940 - MRN:[id number]  Primary Cardiologist: None  Consulting Cardiologist: Dannielle Mackay MD, [facility], [facility]     Reason for consult:  Atrial fibrillation with RVR    HPI:  83-year-old woman Kinyarwanda-speaking, with a history of end-stage renal disease on hemodialysis, dementia, hypertension, hypothyroidism, presenting with nausea vomiting and generalized weakness.  Patient has baseline dementia, Kinyarwanda-speaking, history was limited.  Course has been complicated by hypertensive urgency, known decubitus ulcer, and A-fib with RVR.  Cardiology was consulted for further management.  Labs notable for creatinine of 1.87, potassium 3.2.    SUBJECTIVE:  Unable to obtain due to underlying dementia     Assessment and Plan     Atrial fibrillation with RVR  Unclear chronicity.  Not on any blood thinner medication as an outpatient.  Remains in atrial fibrillation now with better rate control.  - Given advanced age, frailty, will focus on rate control alone  - Treat underlying infection as decreased adrenaline tone would help with elevated heart rate  - Continue carvedilol 6.25 mg twice daily for rate control, uptitrated as needed for heart rate goal less than 110 bpm.  If blood pressure is limiting, would switch to metoprolol  - Wean off diltiazem drip  - Elevated [id number] score of 4.  Based on age greater than 80 years old and weight less than 60 kg would benefit from anticoagulation for stroke prevention.  - Based on records from communication with hospitalist to power of , they are not interested in palliative care at this time and would like to proceed with medical management  - Given goal of care, would initiate Eliquis 2.5 mg twice daily for thromboembolic prophylaxis.  Stop subcutaneous heparin    Urosepsis  +UA  - Abx per Hospitalist team    Hypertensive urgency  Blood pressure now

## 2024-05-26 NOTE — FLOWSHEET NOTE
Hemodialysis Central Access Right Subclavian   No placement date or time found.   Present on Admission/Arrival: Yes  Inserted by: PTA  Orientation: Right  Access Location: Subclavian   Continued need for line? Yes   Site Assessment Clean, dry & intact   Venous Lumen Status Sluggish blood return;Flushed;Infusing   Arterial Lumen Status Sluggish blood return;Flushed;Infusing   Alcohol Cap Used No   Line Care Ports disinfected;Line pulled back;Connections checked and tightened   Dressing Type Gauze;Tape   Dressing Status Clean, dry & intact   Dressing Intervention Removed;Dressing changed;New   Dressing Change Due 06/02/24   Primary RN SBAR: Mary Dhillon RN  Patient Education provided: Procedural  Incapacitated Nurse [facility]. provided: Yes  Preferred Education method and Primary language: Qatari speaking only, does not answer question.  Hospital associated wait time; reason: NA  Hepatitis B Surface Ag   Date/Time Value Ref Range Status   05/26/2024 08:40 AM <0.10 Index Final         POST HD   05/26/24 1750   Vital Signs   /76   Pulse 97   Respirations 18   SpO2 98 %   Pain Assessment   Pain Assessment None - Denies Pain   Post-Hemodialysis Assessment   Post-Treatment Procedures Blood returned;Catheter Capped, clamped with Saline x2 ports   Machine Disinfection Process Acid/Vinegar Clean;Heat Disinfect;Exterior Machine Disinfection   Rinseback Volume (ml) 260 ml   Blood Volume Processed (Liters) 36.4 L   Dialyzer Clearance Heavily streaked   Duration of Treatment (minutes) 120 minutes   Hemodialysis Intake (ml) 460 ml   Hemodialysis Output (ml) 1460 ml   NET Removed (ml) 1000   Tolerated Treatment Good   Interventions Taken Fluid bolus;Head of bed lowered   Patient Response to Treatment responded well; asymptomatic   Bilateral Breath Sounds Clear   Edema None   Physician Notified Yes   Time Off 1750   Patient Disposition Remain in ICU/ED  (Remains in room 468; bed low locked position with alarm activated, call

## 2024-05-26 NOTE — CONSULTS
[facility]    Cuco [facility]ao  YOB: 1940          Assessment & Plan:     KANE on CKD, presumed ESRD  Low serum creatinine d/t low muscle mass. 24 hour urine CrCl arouind 15 or less  Uncontrolled HTN, better  Dementia  UTI  AF RVR  Debility  Decubitus ulcer    Rec:  HD x 2 hours today  HD TTS       Subjective:   CC: ESRD  HPI: Pt on HD TTS at  [facility] came in with hypertensive urgency. Now better and off cardene. Did not have her dialysis yesterday. Currently comfortable on RA with some evidence pulm edema  on xray yesterday.     Current Facility-Administered Medications   Medication Dose Route Frequency    carvedilol (COREG) tablet 6.25 mg  6.25 mg Oral BID WC    levothyroxine (SYNTHROID) tablet 50 mcg  50 mcg Oral QAM AC    niCARdipine (CARDENE) 25 mg in sodium chloride 0.9 % 250 mL infusion (Ssbn1Yic)  2.5-15 mg/hr IntraVENous Continuous    famotidine (PEPCID) tablet 20 mg  20 mg Oral Daily    sodium chloride flush 0.9 % injection 5-40 mL  5-40 mL IntraVENous 2 times per day    sodium chloride flush 0.9 % injection 5-40 mL  5-40 mL IntraVENous PRN    0.9 % sodium chloride infusion   IntraVENous PRN    ondansetron (ZOFRAN-ODT) disintegrating tablet 4 mg  4 mg Oral Q8H PRN    Or    ondansetron (ZOFRAN) injection 4 mg  4 mg IntraVENous Q6H PRN    polyethylene glycol (GLYCOLAX) packet 17 g  17 g Oral Daily PRN    acetaminophen (TYLENOL) tablet 650 mg  650 mg Oral Q6H PRN    heparin (porcine) injection 5,000 Units  5,000 Units SubCUTAneous 3 times per day    cefepime (MAXIPIME) 1,000 mg in sodium chloride 0.9 % 50 mL IVPB (mini-bag)  1,000 mg IntraVENous Q12H    dilTIAZem injection 10 mg  10 mg IntraVENous Q2H PRN          Objective:     Vitals:  Blood pressure (!) 97/59, pulse (!) 108, temperature 97.4 °F (36.3 °C), resp. rate 19, height 1.626 m (5' 4\"), weight 59 kg (130 lb), SpO2 94 %.  Temp (24hrs), Av.8 °F (36.6 °C), Min:97.4 °F (36.3

## 2024-05-27 VITALS
RESPIRATION RATE: 16 BRPM | WEIGHT: 130 LBS | DIASTOLIC BLOOD PRESSURE: 66 MMHG | BODY MASS INDEX: 22.2 KG/M2 | SYSTOLIC BLOOD PRESSURE: 103 MMHG | TEMPERATURE: 98.6 F | HEART RATE: 94 BPM | OXYGEN SATURATION: 94 % | HEIGHT: 64 IN

## 2024-05-27 LAB
ALBUMIN SERPL-MCNC: 2.3 G/DL (ref 3.5–5)
ALBUMIN/GLOB SERPL: 0.6 (ref 1.1–2.2)
ALP SERPL-CCNC: 74 U/L (ref 45–117)
ALT SERPL-CCNC: 18 U/L (ref 12–78)
ANION GAP SERPL CALC-SCNC: 6 MMOL/L (ref 5–15)
AST SERPL-CCNC: 14 U/L (ref 15–37)
BACTERIA SPEC CULT: NORMAL
BILIRUB SERPL-MCNC: 0.3 MG/DL (ref 0.2–1)
BUN SERPL-MCNC: 53 MG/DL (ref 6–20)
BUN/CREAT SERPL: 32 (ref 12–20)
CALCIUM SERPL-MCNC: 8.9 MG/DL (ref 8.5–10.1)
CHLORIDE SERPL-SCNC: 103 MMOL/L (ref 97–108)
CO2 SERPL-SCNC: 27 MMOL/L (ref 21–32)
CREAT SERPL-MCNC: 1.68 MG/DL (ref 0.55–1.02)
ERYTHROCYTE [DISTWIDTH] IN BLOOD BY AUTOMATED COUNT: 18.7 % (ref 11.5–14.5)
GLOBULIN SER CALC-MCNC: 3.7 G/DL (ref 2–4)
GLUCOSE SERPL-MCNC: 172 MG/DL (ref 65–100)
HCT VFR BLD AUTO: 29.8 % (ref 35–47)
HGB BLD-MCNC: 9.4 G/DL (ref 11.5–16)
MAGNESIUM SERPL-MCNC: 2.3 MG/DL (ref 1.6–2.4)
MCH RBC QN AUTO: 29.6 PG (ref 26–34)
MCHC RBC AUTO-ENTMCNC: 31.5 G/DL (ref 30–36.5)
MCV RBC AUTO: 93.7 FL (ref 80–99)
NRBC # BLD: 0 K/UL (ref 0–0.01)
NRBC BLD-RTO: 0 PER 100 WBC
PHOSPHATE SERPL-MCNC: 2.4 MG/DL (ref 2.6–4.7)
PLATELET # BLD AUTO: 223 K/UL (ref 150–400)
PMV BLD AUTO: 10.7 FL (ref 8.9–12.9)
POTASSIUM SERPL-SCNC: 3.7 MMOL/L (ref 3.5–5.1)
PROT SERPL-MCNC: 6 G/DL (ref 6.4–8.2)
RBC # BLD AUTO: 3.18 M/UL (ref 3.8–5.2)
SERVICE CMNT-IMP: NORMAL
SODIUM SERPL-SCNC: 136 MMOL/L (ref 136–145)
WBC # BLD AUTO: 6.6 K/UL (ref 3.6–11)

## 2024-05-27 PROCEDURE — 6370000000 HC RX 637 (ALT 250 FOR IP): Performed by: INTERNAL MEDICINE

## 2024-05-27 PROCEDURE — 36556 INSERT NON-TUNNEL CV CATH: CPT

## 2024-05-27 PROCEDURE — 6360000002 HC RX W HCPCS: Performed by: NURSE PRACTITIONER

## 2024-05-27 PROCEDURE — 85027 COMPLETE CBC AUTOMATED: CPT

## 2024-05-27 PROCEDURE — 2580000003 HC RX 258: Performed by: HOSPITALIST

## 2024-05-27 PROCEDURE — 2580000003 HC RX 258: Performed by: NURSE PRACTITIONER

## 2024-05-27 PROCEDURE — 6370000000 HC RX 637 (ALT 250 FOR IP): Performed by: HOSPITALIST

## 2024-05-27 PROCEDURE — 83735 ASSAY OF MAGNESIUM: CPT

## 2024-05-27 PROCEDURE — 36415 COLL VENOUS BLD VENIPUNCTURE: CPT

## 2024-05-27 PROCEDURE — 84100 ASSAY OF PHOSPHORUS: CPT

## 2024-05-27 PROCEDURE — 80053 COMPREHEN METABOLIC PANEL: CPT

## 2024-05-27 PROCEDURE — 6360000002 HC RX W HCPCS: Performed by: HOSPITALIST

## 2024-05-27 RX ORDER — CEFDINIR 300 MG/1
300 CAPSULE ORAL 2 TIMES DAILY
Qty: 10 CAPSULE | Refills: 0 | Status: SHIPPED | OUTPATIENT
Start: 2024-05-27 | End: 2024-06-01

## 2024-05-27 RX ORDER — CEPHALEXIN 500 MG/1
500 CAPSULE ORAL 3 TIMES DAILY
Qty: 9 CAPSULE | Refills: 0 | Status: SHIPPED
Start: 2024-05-27 | End: 2024-05-27 | Stop reason: HOSPADM

## 2024-05-27 RX ADMIN — APIXABAN 2.5 MG: 2.5 TABLET, FILM COATED ORAL at 10:41

## 2024-05-27 RX ADMIN — LEVOTHYROXINE SODIUM 50 MCG: 0.05 TABLET ORAL at 06:22

## 2024-05-27 RX ADMIN — WATER 2.5 MG: 1 INJECTION INTRAMUSCULAR; INTRAVENOUS; SUBCUTANEOUS at 01:38

## 2024-05-27 RX ADMIN — CARVEDILOL 6.25 MG: 6.25 TABLET, FILM COATED ORAL at 10:41

## 2024-05-27 RX ADMIN — FAMOTIDINE 20 MG: 20 TABLET, FILM COATED ORAL at 10:41

## 2024-05-27 RX ADMIN — CEFEPIME 1000 MG: 1 INJECTION, POWDER, FOR SOLUTION INTRAMUSCULAR; INTRAVENOUS at 01:18

## 2024-05-27 ASSESSMENT — PAIN SCALES - GENERAL
PAINLEVEL_OUTOF10: 2
PAINLEVEL_OUTOF10: 0

## 2024-05-27 ASSESSMENT — PAIN DESCRIPTION - ORIENTATION: ORIENTATION: LEFT

## 2024-05-27 ASSESSMENT — PAIN DESCRIPTION - LOCATION: LOCATION: NECK

## 2024-05-27 NOTE — DISCHARGE INSTRUCTIONS
emergency room if you cannot get hold of your doctor:  Fever, chills, nausea, vomiting, diarrhea, change in mentation, falling, bleeding, shortness of breath    Follow Up:  Please call the below provider to arrange hospital follow up appointment      No follow-up provider specified.    For questions regarding your Hospitalization or to contact the Hospital Medicine team, please call [phone].      Information obtained by :  I understand that if any problems occur once I am at home I am to contact my physician.    I understand and acknowledge receipt of the instructions indicated above.                                                                                                                                           Physician's or R.N.'s Signature                                                                  Date/Time                                                                                                                                              Patient or Representative Signature                                                          Date/Time

## 2024-05-27 NOTE — ACP (ADVANCE CARE PLANNING)
Advance Care Planning Note    Name: Cuco Chao  YOB: 1940  MRN: [id number]  Admission Date: 5/25/2024  9:28 AM    Date of discussion: 5/27/2024    Active Diagnoses:        These active diagnoses are of sufficient risk that focused discussion on advance care planning is indicated in order to allow the patient to thoughtfully consider personal goals of care, and if situations arise that prevent the ability to personally give input, to ensure appropriate representation of their personal desires for different levels and aggressiveness of care.     Discussion:     Persons present and participating in discussion: Cuco Chao, Jose Luis Ramirez MD, Andrey Cuco    Discussion: ESRD disease treatment and prognosis in setting of advanced age, severe debility, chronic wounds, heart disease and dementia. Niece is spokesperson and MPOA.  The patient has no AD.  Niece wants aggressive care indefinitely.  Patient remains full code.  I discussed that resuscitation efforts in this case are likely to cause severe suffering and have little hope of improving outcomes. The niece does not want a consultation with hospice, and is not ready to focus on the comfort of the patient.     Time Spent:     Total time spent face-to-face in education and discussion: 19 minutes.     Jose Luis Ramirez MD  Date of Service:  5/27/2024  11:41 AM

## 2024-05-27 NOTE — CARE COORDINATION
5/27/2024  12:56 PM  DC order noted, pt medically stable for DC to home today.  CM received TC from Azooo @ [employer] who is [employer] transportation contractor.  [employer] will transport pt to home today @ 5300.  Dion confirmed he will contact [employer] tomorrow 5/28 for auth and no PCS or transport packet is required for this trip.  Dion will also contact michelle Pulliam to confirm transport time.  KUMAR Castro

## 2024-05-27 NOTE — PLAN OF CARE
Problem: Discharge Planning  Goal: Discharge to home or other facility with appropriate resources  5/26/2024 0127 by aBrbara Singleton RN  Outcome: Progressing  5/26/2024 0127 by Barbara Singleton RN  Outcome: Progressing  5/25/2024 1929 by Mary Dhillon RN  Outcome: Not Progressing     Problem: Pain  Goal: Verbalizes/displays adequate comfort level or baseline comfort level  5/26/2024 0127 by Barbara Singleton RN  Outcome: Progressing  5/26/2024 0127 by Barbara Singleton RN  Outcome: Progressing  5/25/2024 1929 by Mary Dhillon RN  Outcome: Progressing     Problem: Skin/Tissue Integrity  Goal: Absence of new skin breakdown  Description: 1.  Monitor for areas of redness and/or skin breakdown  2.  Assess vascular access sites hourly  3.  Every 4-6 hours minimum:  Change oxygen saturation probe site  4.  Every 4-6 hours:  If on nasal continuous positive airway pressure, respiratory therapy assess nares and determine need for appliance change or resting period.  5/26/2024 0127 by Barbara Singleton RN  Outcome: Progressing  5/26/2024 0127 by Barbara Singleton RN  Outcome: Progressing  5/25/2024 1929 by Mary Dhillon RN  Outcome: Not Progressing     Problem: Safety - Adult  Goal: Free from fall injury  5/26/2024 0127 by Barbara Singleton RN  Outcome: Progressing  5/26/2024 0127 by Barbara Singleton RN  Outcome: Progressing  5/25/2024 1929 by Mary Dhillon RN  Outcome: Progressing     Problem: Discharge Planning  Goal: Discharge to home or other facility with appropriate resources  5/26/2024 0127 by Barbara Singleton RN  Outcome: Progressing  5/26/2024 0127 by Barbara Singleton RN  Outcome: Progressing  5/25/2024 1929 by Mary Dhillon RN  Outcome: Not Progressing     Problem: Skin/Tissue Integrity  Goal: Absence of new skin breakdown  Description: 1.  Monitor for areas of redness and/or skin breakdown  2.  Assess vascular access sites hourly  3.  Every 4-6 hours minimum:  Change oxygen 
  Problem: Discharge Planning  Goal: Discharge to home or other facility with appropriate resources  5/27/2024 1448 by Yessica Cotton RN  Outcome: Completed  5/27/2024 1257 by Yessica Cotton RN  Outcome: Progressing  Flowsheets (Taken 5/27/2024 0803)  Discharge to home or other facility with appropriate resources:   Identify barriers to discharge with patient and caregiver   Arrange for needed discharge resources and transportation as appropriate   Identify discharge learning needs (meds, wound care, etc)     Problem: Pain  Goal: Verbalizes/displays adequate comfort level or baseline comfort level  5/27/2024 1448 by Yessica Cotton RN  Outcome: Completed  5/27/2024 1257 by Yessica Cotton RN  Outcome: Progressing     Problem: Skin/Tissue Integrity  Goal: Absence of new skin breakdown  Description: 1.  Monitor for areas of redness and/or skin breakdown  2.  Assess vascular access sites hourly  3.  Every 4-6 hours minimum:  Change oxygen saturation probe site  4.  Every 4-6 hours:  If on nasal continuous positive airway pressure, respiratory therapy assess nares and determine need for appliance change or resting period.  5/27/2024 1448 by Yessica Cotton RN  Outcome: Completed  5/27/2024 1257 by Yessica Cotton RN  Outcome: Progressing     Problem: Safety - Adult  Goal: Free from fall injury  5/27/2024 1448 by Yessica Cotton RN  Outcome: Completed  5/27/2024 1257 by Yessica Cotton RN  Outcome: Progressing  Flowsheets (Taken 5/27/2024 0803)  Free From Fall Injury: Instruct family/caregiver on patient safety     Problem: ABCDS Injury Assessment  Goal: Absence of physical injury  5/27/2024 1448 by Yessica Cotton RN  Outcome: Completed  5/27/2024 1257 by Yessica Cotton RN  Outcome: Progressing  Flowsheets (Taken 5/27/2024 0803)  Absence of Physical Injury: Implement safety measures based on patient assessment     Problem: Confusion  Goal: Confusion, delirium, dementia, or psychosis is improved or at 
  Problem: Discharge Planning  Goal: Discharge to home or other facility with appropriate resources  Outcome: Progressing     Problem: Pain  Goal: Verbalizes/displays adequate comfort level or baseline comfort level  Outcome: Progressing     Problem: Skin/Tissue Integrity  Goal: Absence of new skin breakdown  Description: 1.  Monitor for areas of redness and/or skin breakdown  2.  Assess vascular access sites hourly  3.  Every 4-6 hours minimum:  Change oxygen saturation probe site  4.  Every 4-6 hours:  If on nasal continuous positive airway pressure, respiratory therapy assess nares and determine need for appliance change or resting period.  Outcome: Progressing     Problem: Safety - Adult  Goal: Free from fall injury  Outcome: Progressing     Problem: ABCDS Injury Assessment  Goal: Absence of physical injury  Outcome: Progressing     
  Problem: Discharge Planning  Goal: Discharge to home or other facility with appropriate resources  Outcome: Progressing  Flowsheets (Taken 5/27/2024 0803)  Discharge to home or other facility with appropriate resources:   Identify barriers to discharge with patient and caregiver   Arrange for needed discharge resources and transportation as appropriate   Identify discharge learning needs (meds, wound care, etc)     Problem: Pain  Goal: Verbalizes/displays adequate comfort level or baseline comfort level  Outcome: Progressing     Problem: Skin/Tissue Integrity  Goal: Absence of new skin breakdown  Description: 1.  Monitor for areas of redness and/or skin breakdown  2.  Assess vascular access sites hourly  3.  Every 4-6 hours minimum:  Change oxygen saturation probe site  4.  Every 4-6 hours:  If on nasal continuous positive airway pressure, respiratory therapy assess nares and determine need for appliance change or resting period.  Outcome: Progressing     Problem: Safety - Adult  Goal: Free from fall injury  Outcome: Progressing  Flowsheets (Taken 5/27/2024 0803)  Free From Fall Injury: Instruct family/caregiver on patient safety     Problem: ABCDS Injury Assessment  Goal: Absence of physical injury  Outcome: Progressing  Flowsheets (Taken 5/27/2024 0803)  Absence of Physical Injury: Implement safety measures based on patient assessment     Problem: Confusion  Goal: Confusion, delirium, dementia, or psychosis is improved or at baseline  Description: INTERVENTIONS:  1. Assess for possible contributors to thought disturbance, including medications, impaired vision or hearing, underlying metabolic abnormalities, dehydration, psychiatric diagnoses, and notify attending LIP  2. [place] high risk fall precautions, as indicated  3. Provide frequent short contacts to provide reality reorientation, refocusing and direction  4. Decrease environmental stimuli, including noise as appropriate  5. Monitor and intervene to 
  Problem: Pain  Goal: Verbalizes/displays adequate comfort level or baseline comfort level  Outcome: Progressing     Problem: Safety - Adult  Goal: Free from fall injury  Outcome: Progressing     Problem: Discharge Planning  Goal: Discharge to home or other facility with appropriate resources  Outcome: Not Progressing     Problem: Skin/Tissue Integrity  Goal: Absence of new skin breakdown  Description: 1.  Monitor for areas of redness and/or skin breakdown  2.  Assess vascular access sites hourly  3.  Every 4-6 hours minimum:  Change oxygen saturation probe site  4.  Every 4-6 hours:  If on nasal continuous positive airway pressure, respiratory therapy assess nares and determine need for appliance change or resting period.  Outcome: Not Progressing     
high risk fall precautions, as indicated  3. Provide frequent short contacts to provide reality reorientation, refocusing and direction  4. Decrease environmental stimuli, including noise as appropriate  5. Monitor and intervene to maintain adequate nutrition, hydration, elimination, sleep and activity  6. If unable to ensure safety without constant attention obtain sitter and review sitter guidelines with assigned personnel  7. Initiate Psychosocial CNS and Spiritual Care consult, as indicated  Outcome: Progressing  Flowsheets (Taken 5/26/2024 2103)  Effect of thought disturbance (confusion, delirium, dementia, or psychosis) are managed with adequate functional status: Assess for contributors to thought disturbance, including medications, impaired vision or hearing, underlying metabolic abnormalities, dehydration, psychiatric diagnoses, notify LIP

## 2024-05-27 NOTE — DISCHARGE SUMMARY
Physician Discharge Summary     Patient ID:  Cuco Chao  [id number]  83 y.o.  1940    Admit date: 5/25/2024    Discharge date of service and time: 5/27/2024  Greater than 30 minutes were spent providing discharge related services for this patient    Admission Diagnoses: Chronic pulmonary edema [J81.1]  Septicemia (HCC) [A41.9]  Sepsis (HCC) [A41.9]  Urinary tract infection without hematuria, site unspecified [N39.0]  Atrial fibrillation, unspecified type (HCC) [I48.91]  Osteomyelitis, unspecified site, unspecified type (HCC) [M86.9]  Pressure injury of sacral region, stage 4 ([facility]) [L89.154]    Discharge Diagnoses:    Principal Diagnosis   Hypertensive urgency                                             Hospital Course and other diagnoses  Hypertensive urgency / Hypertension - POA, likely fluid overload and medical non compliance (skipped 24hr meds).  Weaned off nicardipine drip, at home resume metoprolol, higher dose     Atrial fibrillation with rapid ventricular response - POA, unclear chronicity.  Consulted cardiology.  Metoprolol for rate control.  No anticoagulation due to advanced age.      Hypokalemia / End stage renal disease on dialysis / Hypoalbuminemia - POA, Consulted nephrology who ordered HD.     Urinary tract infection - Contaminated UA sample collected in ER, and again contaminated in ICU. Not septic. Asymptomatic. Cx NGTD.  Got cefepime.  DC home on omnicef     Decubitus ulcer - Wound consulted.  Resume home care.  DC on omnicef.  This wound will never heal, due to immobility, ESRD, advanced age.     Nausea vomiting and diarrhea / Chest pain - POA, likely due to UTI.  Monitor.  Pepcid.  Prn Zofran.  ADAT      Dementia - POA and severe.  Palliative consult.  Hospice would be an appropriate disposition, but niece declines as she feels withdrawal of dialysis amounts to \"killing\" the patient, regardless of the suffering of the patient.      Anemia - POA presumed due to ESRD.  Monitor. EPO

## 2024-05-29 LAB
BACTERIA SPEC CULT: ABNORMAL
BACTERIA SPEC CULT: ABNORMAL
CC UR VC: ABNORMAL
SERVICE CMNT-IMP: ABNORMAL

## 2024-05-29 NOTE — PROGRESS NOTES
Critical Care Progress Note  Arianna Salazar MD          Date of Service:  2024  NAME:  Cuco Chao  :  1940  MRN:  [id number]      Subjective/Hospital course:      82 yo female with hx of htn, ESRD on HD presneting with chest pain, nausea and diarrhea.      Poor historian. Not able to give details despite .  Does not appear to have significant shortness of breath.  Reports that her chest pain has improved slightly.  Says that she has been having significant diarrhea for the last few days.  She has had 2 episodes of diarrhea since coming to the emergency room.  Reports feeling thirsty.  No fever or chills.  No vomiting since being in the emergency room.    No IV access despite multiple attempts in ED. SBP in 200s.       - IJ placed and patient started on cardene ggt.        Problem list:   Hypertensive Emergency  ESRD   Elevated Troponin   ?UTI  Dementia  Afib with rvr       Assessment/Plan:     - start coreg BID for HTN and rate control   - wean cardene ggt  - recommend prn hydralazine/labetalol for htn emergency.  -Nephrology consultation for dialysis.  -repeat troponin to ensure resolution   -PPI for gastritis  -Antibiotics for UTI per primary team  -CT abdomen/pelvis without contrast reviewed.   -Primary team working on obtaining more information about the patient and being able to track patient's family.  Currently, we have limited information.  -Renal diet  -Heparin subcu for prophylaxis.  -Goals of care and CODE STATUS deferred to primary team.        I personally spent 35 minutes of critical care time.  This is time spent at this critically ill patient's bedside actively involved in patient care as well as the coordination of care and discussions with the patient's family.  This does not include any procedural time which has been billed separately.      Care Plan discussed with: Nurse        Review of Systems:   Review of Systems   Unable to perform 
  [facility]    Hospitalist Progress Note    NAME: Cuco Chao   :  1940  MRM:  [id number]    Date/Time of service 2024  10:32 AM    To assist coordination of care and communication with nursing and staff, this note may be preliminary early in the day, but finalized by end of the day.        Assessment and Plan:     Hypertensive urgency / Hypertension - POA, likely fluid overload and perhaps medical non compliance.  For now nicardipine drip    Atrial fibrillation with rapid ventricular response - POA, unclear chronicity.  Consult cardiology.  Heparin SQ. Check ECHO      Hypokalemia / End stage renal disease on dialysis / Hypoalbuminemia - POA, Consult nephrology to manage HD.    Decubitus ulcer - Wound consult.  For now cefepime.    Nausea vomiting and diarrhea / Chest pain - POA, likely a viral gastroenteritis vs HTN.  Monitor.  Pepcid.  Prn Zofran.  ADAT      Dementia - POA and severe.  Palliative consult.  Hospice would be an appropriate disposition, but niece declines as she feels withdrawal of dialysis amounts to \"killing\" the patient, regardless of the suffering of the patient.      Anemia - POA presumed due to ESRD.  Monitor. EPO per renal    Hypothyroidism - I doubt this Dx.  Check TSH. Stop homeopathic synthroid.      Troponin I above reference range - Troponin level is normal, in setting of ESRD, age and HTN. No further workup warranted      Pleural effusion / Pulmonary edema - POA, not hypoxic, fluid removal per HD at tolerated.      Pyuria - Contaminated UA sample collected in ER.  Collect sterile cath urine. Continue cefepime       Subjective:     Chief Complaint:  cannot obtain    ROS:  (bold if positive, if negative)    Not Tolerating PT  Tolerating Diet        Objective:     Last 24hrs VS reviewed since prior progress note. Most recent are:    Vitals:    24 0500 24 0600 24 0700 24 0704   BP: (!) 160/92 (!) 147/85 (!) 155/106 (!) 
Brief Nutrition Assessment    Type and Reason for Visit: Brief Assessment     Nutrition Recommendations/Plan:   Continue ONS: Provide Nepro TID to increase kcal/protein intake (420 kcal, 38 g Carbs, 19 g Protein)      Nutrition Assessment:  Chart reviewed by weekend on-call dietitian. Positive MST alert was triggered based on results obtained during nursing admission assessment for Weight loss 2 -13#. Nurse driven early ONS protocol was ordered. Full nutrition assessment will be completed per policy.      Weight History:   Wt Readings from Last 10 Encounters:   05/25/24 59 kg (130 lb)       Diet Order:  ADULT ORAL NUTRITION SUPPLEMENT; Breakfast, Lunch, Dinner; Renal Oral Supplement  ADULT DIET; Dysphagia - Minced and Moist; Low Sodium (2 gm); Low Potassium (Less than 3000 mg/day); Low Phosphorus (Less than 1000 mg)    PO Intake Documented:   Meal Intake:   No data found.  Supplement Intake:  No data found.    Last BG:   Lab Results   Component Value Date/Time    GLUCOSE 92 05/26/2024 03:42 AM         Electronically signed by Scot Sanches RD  Contact: [phone] or via Vaioni      
Khmer speaking session ID # [id number]   
Multiple attempts t IV access are unsuccessful.  MD notified.  Plan to set u p for an intraosseous line  
Patient pulled out her IJ central line. Patient very confused, NP informed. Sitter placed for patient's safety.   
Pharmacist adjusted cefepime to 1 gram IV every 24 hours EI for HD patient per approved protocol.    Thank you,      Eneida Salas, PharmD, BCPS    
Pt's niece called last night around 9pm. Night shift RN was able to provide condition of pt and obtain some history. Niece informed RN that she will be coming to visit today (5/26).    Contact info for niece  Name: Cuco  Phone number: [phone]  
Reviewed urine culture results.  Initial urine sample contaminate, and grew two organisms (E coli and Klebsiella.  Presumed contamination, not UTI.  Repeat urine cx negative, without having treatment ESBL e coli.  No further workup or treatment needed.  
Spiritual Care Assessment/Progress Note  [facility]    Name: Cuco Chao MRN: [id number]    Age: 83 y.o.     Sex: female   Language: Romanian     Date: 5/26/2024            Total Time Calculated: 12 min              Spiritual Assessment begun in [facility] A4 INTENSIVE CARE UNIT  Service Provided For: Patient and family together  Referral/Consult From: Palliative Care  Encounter Overview/Reason: Initial Encounter    Spiritual beliefs:      [x] Involved in a nate tradition/spiritual practice:      [] Supported by a nate community:      [] Claims no spiritual orientation:      [] Seeking spiritual identity:           [] Adheres to an individual form of spirituality:      [] Not able to assess:                Identified resources for coping and support system:   Support System: Family members       [] Prayer                  [] Devotional reading               [] Music                  [] Guided Imagery     [] Pet visits                                        [] Other: (COMMENT)     Specific area/focus of visit   Encounter:    Crisis:    Spiritual/Emotional needs: Type: Spiritual Support, Other (comment) (emotional support)  Ritual, Rites and Sacraments:    Grief, Loss, and Adjustments:    Ethics/Mediation:    Behavioral Health:    Palliative Care: Type: Palliative Care, Initial/Spiritual Assessment  Advance Care Planning:      Plan/Referrals: No future visits requested    Narrative:   Visited Ms Chao in room A468 for initial spiritual assessment. Reviewed patient's chart prior to visit. Ms Chao was lying quietly in bed; she did not respond to questions during the visit. Patient's niece was present and in good spirits.    Provided spiritual presence as niece shared about patient's current health issues. She said that Ms Chao had dementia and that the only thing she had been saying this morning was that she wanted a shower. Niece stated that patient lived with her and she was patient's 
negative)    Not Tolerating PT  Tolerating Diet        Objective:     Last 24hrs VS reviewed since prior progress note. Most recent are:    Vitals:    05/26/24 2103 05/26/24 2300 05/26/24 2340 05/27/24 0305   BP: 112/70  115/70 110/64   Pulse: 97 90 90 95   Resp: 16  18 17   Temp: 97.7 °F (36.5 °C)  97.9 °F (36.6 °C) 97.7 °F (36.5 °C)   TempSrc: Oral  Axillary Oral   SpO2: 96%  95% 94%   Weight:       Height:          @[id number]@       Intake/Output Summary (Last 24 hours) at 5/27/2024 0755  Last data filed at 5/27/2024 0301  Gross per 24 hour   Intake 747.55 ml   Output 1590 ml   Net -842.45 ml          Physical Exam:    Gen:  Frail, ill appearing, in no acute distress  HEENT:  Pink conjunctivae, PERRL, hearing intact to voice, moist mucous membranes  Neck:  Supple, without masses, thyroid non-tender  Resp:  No accessory muscle use, decrease breath sounds  Card:  No murmurs, irregular tachycardic S1, S2 without thrills, bruits or peripheral edema  Abd:  Soft, non-tender, non-distended, normoactive bowel sounds are present, no mass  Lymph:  No cervical or inguinal adenopathy  Musc:  No cyanosis or clubbing  Skin:  decubitus ulcers, skin turgor is good  Neuro:  Cranial nerves are grossly intact, general motor weakness, follows commands   Psych:  Poor insight, oriented to person at best    Telemetry reviewed:   normal sinus rhythm  __________________________________________________________________  Medications Reviewed: (see below)  Medications:     Current Facility-Administered Medications   Medication Dose Route Frequency    [START ON 5/28/2024] cefepime (MAXIPIME) 1,000 mg in sodium chloride 0.9 % 50 mL IVPB (mini-bag)  1,000 mg IntraVENous Q24H    carvedilol (COREG) tablet 6.25 mg  6.25 mg Oral BID WC    apixaban (ELIQUIS) tablet 2.5 mg  2.5 mg Oral BID    famotidine (PEPCID) tablet 20 mg  20 mg Oral Daily    sodium chloride flush 0.9 % injection 5-40 mL  5-40 mL IntraVENous 2 times per day    sodium

## 2024-05-31 LAB
BACTERIA SPEC CULT: NORMAL
BACTERIA SPEC CULT: NORMAL
SERVICE CMNT-IMP: NORMAL
SERVICE CMNT-IMP: NORMAL